# Patient Record
Sex: FEMALE | Race: WHITE
[De-identification: names, ages, dates, MRNs, and addresses within clinical notes are randomized per-mention and may not be internally consistent; named-entity substitution may affect disease eponyms.]

---

## 2019-12-06 ENCOUNTER — HOSPITAL ENCOUNTER (OUTPATIENT)
Dept: HOSPITAL 92 - BICCT | Age: 79
Discharge: HOME | End: 2019-12-06
Attending: PHYSICIAN ASSISTANT
Payer: MEDICARE

## 2019-12-06 DIAGNOSIS — K86.89: Primary | ICD-10-CM

## 2019-12-06 DIAGNOSIS — A04.72: ICD-10-CM

## 2019-12-06 LAB — ESTIMATED GFR-MDRD - POC: 53

## 2019-12-06 PROCEDURE — 82565 ASSAY OF CREATININE: CPT

## 2019-12-06 PROCEDURE — 74177 CT ABD & PELVIS W/CONTRAST: CPT

## 2019-12-06 NOTE — CT
CT ABDOMEN AND PELVIS WITH ORAL AND IV CONTRAST:

 

HISTORY:

Pancreatic insufficiency. C. diff diarrhea.

 

COMPARISON:

12/19/2016

 

FINDINGS:

The lung bases are clear. A small hiatal hernia is seen. There are calcified granulomas in the liver.
 The pancreas, adrenal glands and kidneys are normal. No calcified gallstones are seen.

 

No free air, free fluid or lymphadenopathy is noted in the abdomen or pelvis. There are vascular calc
ifications without evidence of aneurysmal dilatation of the abdominal aorta. There are degenerative c
hanges and scoliosis of the spine. The small bowel loops are not abnormally dilated. Thickening of th
e pylorus is again seen. The patient is post hysterectomy. A vaginal pessary is present.

 

IMPRESSION:

Stable examination.

 

POS: [facility]